# Patient Record
(demographics unavailable — no encounter records)

---

## 2017-03-15 NOTE — REP
LEFT FOOT SERIES COMPLETE: 03/15/2017.

 

Clinical history:  Trauma, contusion of left foot with injury during gym class.

 

No prior study.

 

Findings:  Four views show the subtalar joints intact.  Talonavicular and

calcaneocuboid joints are normal.  Posterior apophysis of the calcaneus slightly

sclerotic, which may reflect some mild apophysitis. The distal tibia and fibula

show intact growth plates as visible on this exam.  Tarsal bones and their

articulations are unremarkable.  Metatarsals and phalanges along with their

growth plates and respective joints were all unremarkable.  I do not see an

avulsion, fracture or growth plate abnormality.

 

Impression:

 

1.  No fracture, growth plate abnormality, subluxation or focal bone lesion about

the left foot.  Minor sclerosis of the apophysis of the posterior calcaneus,

which could reflect some mild apophysitis.  Does the patient have chronic heel

pain?

 

 

Signed by

Magdy Camacho MD 03/16/2017 09:12 A

## 2019-06-27 NOTE — REP
Clinical: Back pain.

 

Technique:  AP, lateral, bilateral oblique, and coned-down views.

 

Findings:  Alignment and lordosis is maintained.  The vertebral bodies including

transverse process and spinous processes are intact and normal.  There is no

evidence for acute fracture / compression injury or subluxation.  No evidence for

spondylolysis or spondylolisthesis.  No significant degenerative change is

noted.

 

Impression:

No obvious acute lumbosacral spine abnormality appreciated by radiographic

evaluation.

 

 

Electronically Signed by

Evan Kerr MD 06/27/2019 01:40 P

## 2019-09-12 NOTE — REP
Abdomen series:  Three views.

 

History:  Left upper quadrant pain.

 

Findings:  Upright chest radiograph normal.  There is no evidence of infiltrate

or free subdiaphragmatic air.  Heart is not enlarged.

 

Supine and erect views of the abdomen demonstrate a normal bowel gas pattern with

air and stool in a nondistended colon.  Psoas margins and flank stripes are

intact.  No mass, organomegaly or pathologic calcification is seen.  Incidental

note is made of spina bifida occulta at S1.

 

Impression:

 

Negative acute abdominal series.

 

 

Electronically Signed by

Jorge Lind MD 09/12/2019 08:40 A